# Patient Record
Sex: MALE
[De-identification: names, ages, dates, MRNs, and addresses within clinical notes are randomized per-mention and may not be internally consistent; named-entity substitution may affect disease eponyms.]

---

## 2023-03-21 PROBLEM — Z00.00 ENCOUNTER FOR PREVENTIVE HEALTH EXAMINATION: Status: ACTIVE | Noted: 2023-03-21

## 2023-03-22 ENCOUNTER — APPOINTMENT (OUTPATIENT)
Dept: OTOLARYNGOLOGY | Facility: CLINIC | Age: 28
End: 2023-03-22
Payer: COMMERCIAL

## 2023-03-22 VITALS — HEIGHT: 76 IN | WEIGHT: 165 LBS | BODY MASS INDEX: 20.09 KG/M2

## 2023-03-22 DIAGNOSIS — H93.299 OTHER ABNORMAL AUDITORY PERCEPTIONS, UNSPECIFIED EAR: ICD-10-CM

## 2023-03-22 PROCEDURE — 92557 COMPREHENSIVE HEARING TEST: CPT

## 2023-03-22 PROCEDURE — 92504 EAR MICROSCOPY EXAMINATION: CPT

## 2023-03-22 PROCEDURE — 99203 OFFICE O/P NEW LOW 30 MIN: CPT

## 2023-03-22 PROCEDURE — 92550 TYMPANOMETRY & REFLEX THRESH: CPT | Mod: 52
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
## 2023-03-24 NOTE — REASON FOR VISIT
[Initial Consultation] : an initial consultation for [FreeTextEntry2] : possible ear infection in the right ear.

## 2023-03-24 NOTE — PHYSICAL EXAM
[FreeTextEntry1] : \par The patient was alert and oriented and in no distress.\par Voice was clear.\par \par Face:\par The patient had a grade 1/6 facial asymmetry without mass.\par The skin was unremarkable.\par \par Eyes:\par The pupils were equal round and reactive to light and accommodation.\par There was no significant nystagmus or disconjugate gaze noted.\par \par Nose: \par The external nose had no significant deformity.  There was no facial tenderness.  On anterior rhinoscopy, the nasal mucosa was clear.  The anterior septum was midline.  There were no visualized polyps purulence  or masses.\par \par Oral cavity:\par The oral mucosa was normal.\par The oral and base of tongue were clear and without mass.\par The gingival and buccal mucosa were moist and without lesions.\par The palate moved well.\par There was no cleft to the palate.\par There appeared to be good salivary flow.  \par There was no pus, erythema or mass in the oral cavity.\par \par \par Ears:\par The external ears were normal without deformity.\par An operating microscope was used for binocular evaluation.  The left ear canal was clear, the left eardrum was intact and mobile.\par There was a mucoid discharge in the right external canal and this was cultured and suctioned to clear.  The tympanic membrane was intact with what looked like an ossific ossicle and the drum was quite thickened and I could not tell whether this was scarring of the middle ear or an effusion.\par \par Neck: \par The neck was symmetrical.\par The parotid and submandibular glands were normal without masses.\par The trachea was midline and there was no unusual crepitus.\par The thyroid was smooth and nontender and no masses were palpated.\par There was no significant cervical adenopathy.\par \par \par Neuro:\par Neurologically, the patient was awake, alert, and oriented to person, place and time. There were no obvious focal neurologic abnormalities.  Cranial nerves II through XII were grossly intact.\par \par \par TMJ:\par The temporomandibular joints were nontender.\par There was no abnormal crepitus and no significant malocclusion\par  [de-identified] : A complete audiogram was ordered, done and reviewed with the patient.  This showed a flat tympanogram in the right ear with a 2025 dB conductive hearing loss

## 2023-03-24 NOTE — CONSULT LETTER
[FreeTextEntry2] : Mercedes Tompkins MD [FreeTextEntry1] : \par \par Dear  Dr. Tompkins\par \par I had the pleasure of seeing your patient today.  \par Please see my note below.\par \par \par Thank you very much for allowing me to participate in the care of your patient.\par \par \par Best\par \par Benny \par \par \par Huber De La O MD\par [facility]\par [facility]\par  [facility]\par \par \par \par
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
## 2023-03-28 LAB — EAR NOSE AND THROAT CULTURE: ABNORMAL

## 2023-04-19 ENCOUNTER — APPOINTMENT (OUTPATIENT)
Dept: OTOLARYNGOLOGY | Facility: CLINIC | Age: 28
End: 2023-04-19
Payer: COMMERCIAL

## 2023-04-19 VITALS — BODY MASS INDEX: 20.09 KG/M2 | WEIGHT: 165 LBS | HEIGHT: 76 IN

## 2023-04-19 DIAGNOSIS — H66.91 OTITIS MEDIA, UNSPECIFIED, RIGHT EAR: ICD-10-CM

## 2023-04-19 DIAGNOSIS — H90.A11 CONDUCTIVE HEARING LOSS, UNILATERAL, RIGHT EAR WITH RESTRICTED HEARING ON THE CONTRALATERAL SIDE: ICD-10-CM

## 2023-04-19 DIAGNOSIS — S02.19XS OTHER FRACTURE OF BASE OF SKULL, SEQUELA: ICD-10-CM

## 2023-04-19 PROCEDURE — 92504 EAR MICROSCOPY EXAMINATION: CPT

## 2023-04-19 PROCEDURE — 99213 OFFICE O/P EST LOW 20 MIN: CPT | Mod: 25

## 2023-04-19 RX ORDER — CIPROFLOXACIN AND DEXAMETHASONE 3; 1 MG/ML; MG/ML
0.3-0.1 SUSPENSION/ DROPS AURICULAR (OTIC) TWICE DAILY
Qty: 1 | Refills: 1 | Status: DISCONTINUED | COMMUNITY
Start: 2023-03-22 | End: 2023-04-19

## 2023-04-19 RX ORDER — AMOXICILLIN AND CLAVULANATE POTASSIUM 875; 125 MG/1; MG/1
875-125 TABLET, COATED ORAL TWICE DAILY
Qty: 20 | Refills: 1 | Status: DISCONTINUED | COMMUNITY
Start: 2023-03-22 | End: 2023-04-19

## 2023-04-19 NOTE — PHYSICAL EXAM
[FreeTextEntry1] : \par The patient was alert and oriented and in no distress.\par Voice was clear.\par \par Face:\par The patient had no facial asymmetry or mass.\par The skin was unremarkable.\par \par Eyes:\par The pupils were equal round and reactive to light and accommodation.\par There was no significant nystagmus or disconjugate gaze noted.\par \par Nose: \par The external nose had no significant deformity.  There was no facial tenderness.  On anterior rhinoscopy, the nasal mucosa was clear.  The anterior septum was midline.  There were no visualized polyps purulence  or masses.\par \par Oral cavity:\par The oral mucosa was normal.\par The oral and base of tongue were clear and without mass.\par The gingival and buccal mucosa were moist and without lesions.\par The palate moved well.\par There was no cleft to the palate.\par There appeared to be good salivary flow.  \par There was no pus, erythema or mass in the oral cavity.\par \par \par Ears: Both ear canals were clear\par And operating microscope was used for binocular evaluation.  The left eardrum was intact and mobile.\par He had mastoidectomy on the right and there was a thickened Anthony tympanum.\par \par \par Neck: \par The neck was symmetrical.\par The parotid and submandibular glands were normal without masses.\par The trachea was midline and there was no unusual crepitus.\par The thyroid was smooth and nontender and no masses were palpated.\par There was no significant cervical adenopathy.\par \par \par Neuro:\par Neurologically, the patient was awake, alert, and oriented to person, place and time. There were no obvious focal neurologic abnormalities.  Cranial nerves II through XII were grossly intact.\par \par \par TMJ:\par The temporomandibular joints were nontender.\par There was no abnormal crepitus and no significant malocclusion\par

## 2023-04-19 NOTE — ASSESSMENT
[FreeTextEntry1] : It was my impression that his otitis had resolved.  He has the history of the right temporal bone fracture with a secondary cholesteatoma that was operated on.  He has a conductive hearing loss but believes it is old and he will get me a copy of his previous evaluations and audiometry.  At this point he is doing well.  I explained that should he have recurrent infection I would want to repeat his imaging to make sure there is not a residual cholesteatoma that is not visualized because of the thickened graft.  I also would recommend further evaluation if his conductive hearing loss turns out to be new, although that seems less likely and he feels that hearing loss is old

## 2023-04-19 NOTE — REASON FOR VISIT
[Subsequent Evaluation] : a subsequent evaluation for [FreeTextEntry2] : Chronic otitis media of right ear

## 2023-04-19 NOTE — HISTORY OF PRESENT ILLNESS
[de-identified] : Donny Gould was seen in follow-up on April 19.  His culture was Pseudomonas and his otorrhea and otalgia have resolved.  His culture was Pseudomonas sensitive to his drops.  The patient had no other ear nose or throat complaints at this visit.

## 2023-04-19 NOTE — CONSULT LETTER
[FreeTextEntry2] : Mercedes Tompkins MD [FreeTextEntry1] : \par \par Dear  Dr. Tompkins\par \par I had the pleasure of seeing your patient today.  \par Please see my note below.\par \par \par Thank you very much for allowing me to participate in the care of your patient.\par \par \par Best\par \par Benny \par \par \par Huber De La O MD\par [facility]\par [facility]\par  [facility]\par \par \par \par